# Patient Record
Sex: FEMALE | Race: OTHER | URBAN - METROPOLITAN AREA
[De-identification: names, ages, dates, MRNs, and addresses within clinical notes are randomized per-mention and may not be internally consistent; named-entity substitution may affect disease eponyms.]

---

## 2023-11-16 ENCOUNTER — TELEPHONE (OUTPATIENT)
Dept: OBGYN CLINIC | Facility: CLINIC | Age: 28
End: 2023-11-16

## 2023-11-16 NOTE — TELEPHONE ENCOUNTER
Patient requesting to transfer prenatal care from [facility], she was given GARRY of 4/15/2024, Initial Ultrasound, Prenatal Labs, Genetic testing completed, no Pap done. Last visit was 10/16 and is scheduled for next visit on 12/1. Paul Rodas will obtain reports prior to scheduling. Unsuccessful in reaching patient after several attempts made to follow up on records. Patient does not have voice mail.

## 2023-11-17 NOTE — TELEPHONE ENCOUNTER
11/17/23 called at 8:03 AM, unable to leave voicemail due to mailbox not set up. I will try again later.

## 2024-05-31 ENCOUNTER — HOSPITAL ENCOUNTER (EMERGENCY)
Facility: HOSPITAL | Age: 29
Discharge: HOME | End: 2024-05-31
Attending: EMERGENCY MEDICINE
Payer: COMMERCIAL

## 2024-05-31 VITALS
RESPIRATION RATE: 18 BRPM | HEART RATE: 69 BPM | OXYGEN SATURATION: 98 % | TEMPERATURE: 98.1 F | SYSTOLIC BLOOD PRESSURE: 98 MMHG | WEIGHT: 137 LBS | DIASTOLIC BLOOD PRESSURE: 74 MMHG

## 2024-05-31 DIAGNOSIS — Z20.3 NEED FOR POST EXPOSURE PROPHYLAXIS FOR RABIES: ICD-10-CM

## 2024-05-31 DIAGNOSIS — Z20.3 RABIES EXPOSURE: Primary | ICD-10-CM

## 2024-05-31 LAB
EXT PREGNANCY TEST URINE: NEGATIVE
EXT. CONTROL: NORMAL

## 2024-05-31 PROCEDURE — 96372 THER/PROPH/DIAG INJ SC/IM: CPT

## 2024-05-31 PROCEDURE — 81025 URINE PREGNANCY TEST: CPT

## 2024-05-31 PROCEDURE — 90375 RABIES IG IM/SC: CPT

## 2024-05-31 PROCEDURE — 99283 EMERGENCY DEPT VISIT LOW MDM: CPT

## 2024-05-31 PROCEDURE — 90471 IMMUNIZATION ADMIN: CPT

## 2024-05-31 PROCEDURE — 90675 RABIES VACCINE IM: CPT

## 2024-05-31 PROCEDURE — 99284 EMERGENCY DEPT VISIT MOD MDM: CPT

## 2024-05-31 RX ADMIN — RABIES VIRUS STRAIN PM-1503-3M ANTIGEN (PROPIOLACTONE INACTIVATED) AND WATER 1 ML: KIT at 19:18

## 2024-05-31 RX ADMIN — RABIES IMMUNE GLOBULIN (HUMAN) 1200 UNITS: 300 INJECTION, SOLUTION INFILTRATION; INTRAMUSCULAR at 19:21

## 2024-05-31 NOTE — ED PROVIDER NOTES
History  Chief Complaint   Patient presents with    Rabies Test     Pt arrives to start rabies series after coming in contact with a bat on Monday     Patient is a 28-year-old female currently breast-feeding arriving for evaluation after a bat exposure on where the bat touched her leg.  Patient's  was also exposed however they were unable to obtain the bat and was not tested.  Patient's currently breast-feeding and wanted to consider if she should get the vaccine or not, and has arrived to the ED with her  to initiate the series.  Patient was concerned due to her breast-feeding.  Patient denies any other complaints.        None       History reviewed. No pertinent past medical history.    History reviewed. No pertinent surgical history.    History reviewed. No pertinent family history.  I have reviewed and agree with the history as documented.    E-Cigarette/Vaping    E-Cigarette Use Never User      E-Cigarette/Vaping Substances     Social History     Tobacco Use    Smoking status: Never    Smokeless tobacco: Never   Vaping Use    Vaping status: Never Used   Substance Use Topics    Alcohol use: Not Currently    Drug use: Never       Review of Systems   Constitutional: Negative.    HENT: Negative.     Eyes: Negative.    Respiratory: Negative.     Cardiovascular: Negative.    Gastrointestinal: Negative.    Endocrine: Negative.    Genitourinary: Negative.    Musculoskeletal: Negative.    Skin: Negative.    Allergic/Immunologic: Negative.    Neurological: Negative.    Hematological: Negative.    Psychiatric/Behavioral: Negative.     All other systems reviewed and are negative.      Physical Exam  Physical Exam  Vitals and nursing note reviewed.   Constitutional:       Appearance: Normal appearance. She is normal weight.   HENT:      Head: Normocephalic.      Right Ear: External ear normal.      Left Ear: External ear normal.      Nose: Nose normal.      Mouth/Throat:      Mouth: Mucous membranes are moist.    Eyes:      Pupils: Pupils are equal, round, and reactive to light.   Cardiovascular:      Rate and Rhythm: Normal rate.      Pulses: Normal pulses.   Pulmonary:      Effort: Pulmonary effort is normal.   Abdominal:      General: Abdomen is flat.   Musculoskeletal:         General: Normal range of motion.      Cervical back: Normal range of motion.   Skin:     General: Skin is warm.      Capillary Refill: Capillary refill takes less than 2 seconds.   Neurological:      General: No focal deficit present.      Mental Status: She is alert and oriented to person, place, and time. Mental status is at baseline.   Psychiatric:         Mood and Affect: Mood normal.         Behavior: Behavior normal.         Thought Content: Thought content normal.         Judgment: Judgment normal.         Vital Signs  ED Triage Vitals [05/31/24 1849]   Temperature Pulse Respirations Blood Pressure SpO2   98.1 °F (36.7 °C) 69 18 98/74 98 %      Temp Source Heart Rate Source Patient Position - Orthostatic VS BP Location FiO2 (%)   Temporal Monitor Sitting Left arm --      Pain Score       --           Vitals:    05/31/24 1849   BP: 98/74   Pulse: 69   Patient Position - Orthostatic VS: Sitting         Visual Acuity      ED Medications  Medications   rabies vaccine, human diploid IM injection 1 mL (1 mL Intramuscular Given 5/31/24 1918)   rabies immune globulin, human (HyperRAB) injection 1,200 Units (1,200 Units Infiltration Given 5/31/24 1921)       Diagnostic Studies  Results Reviewed       Procedure Component Value Units Date/Time    POCT pregnancy, urine [[id number]]  (Normal) Resulted: 05/31/24 1918    Lab Status: Final result Updated: 05/31/24 1918     EXT Preg Test, Ur Negative     Control Valid                   No orders to display              Procedures  Procedures         ED Course  ED Course as of 05/31/24 2232   Fri May 31, 2024   1920 PREGNANCY TEST URINE: Negative                                             Medical Decision  Making  Patient presenting requesting initiation of rabies series.  Patient presenting with her , and the 2 of which were exposed to a bat on Monday.  Patient is currently breast-feeding and was concerned about possible breast milk transference to her infant.  Patient states that she feels more comfortable today.  Discussed with our pharmacist to confirm, and reviewed with patient that there is no reported effects, however did discuss that this is not specifically studied only in retrospect his information collected.  Did review with patient that rabies is fatal and is recommended to be received for prophylaxis due to its fatality, as it is a risk-benefit scenario.  Patient is willing to accept vaccine initiation. Reviewed vaccines schedule. Confirmed bat only touched her, but did not bite or wound.   Reviewed reasons to return to ed.  Patient verbalized understanding of diagnosis and agreement with discharge plan of care as well as understanding of reasons to return to ed.      Amount and/or Complexity of Data Reviewed  Labs: ordered. Decision-making details documented in ED Course.    Risk  Prescription drug management.             Disposition  Final diagnoses:   Rabies exposure   Need for post exposure prophylaxis for rabies     Time reflects when diagnosis was documented in both MDM as applicable and the Disposition within this note       Time User Action Codes Description Comment    5/31/2024  7:10 PM Dorothea Valentine Add [Z20.3] Rabies exposure     5/31/2024  7:10 PM Dorothea Valentine Add [Z20.3] Need for post exposure prophylaxis for rabies           ED Disposition       ED Disposition   Discharge    Condition   Stable    Date/Time   Fri May 31, 2024  7:09 PM    Comment   Kellie Vizcaino discharge to home/self care.                   Follow-up Information       Follow up With Specialties Details Why Contact Info Additional Information     [facility] Emergency Department Emergency  Medicine Go to  If symptoms worsen [id number] [facility] [address] [phone] [facility] Emergency Department, [address], [place] [address]            There are no discharge medications for this patient.      No discharge procedures on file.    PDMP Review       None            ED Provider  Electronically Signed by             MACK Gallegos  05/31/24 2031

## 2024-06-03 ENCOUNTER — OFFICE VISIT (OUTPATIENT)
Dept: URGENT CARE | Facility: CLINIC | Age: 29
End: 2024-06-03
Payer: COMMERCIAL

## 2024-06-03 VITALS
RESPIRATION RATE: 16 BRPM | HEIGHT: 63 IN | BODY MASS INDEX: 24.27 KG/M2 | TEMPERATURE: 97.1 F | WEIGHT: 137 LBS | HEART RATE: 78 BPM | OXYGEN SATURATION: 98 % | DIASTOLIC BLOOD PRESSURE: 60 MMHG | SYSTOLIC BLOOD PRESSURE: 108 MMHG

## 2024-06-03 DIAGNOSIS — Z20.9 EXPOSURE TO BAT WITHOUT KNOWN BITE: Primary | ICD-10-CM

## 2024-06-03 DIAGNOSIS — Z23 NEED FOR RABIES VACCINATION: ICD-10-CM

## 2024-06-03 PROCEDURE — 90675 RABIES VACCINE IM: CPT

## 2024-06-03 PROCEDURE — 90675 RABIES VACCINE IM: CPT | Performed by: PHYSICIAN ASSISTANT

## 2024-06-03 PROCEDURE — 99213 OFFICE O/P EST LOW 20 MIN: CPT | Performed by: PHYSICIAN ASSISTANT

## 2024-06-03 PROCEDURE — S9083 URGENT CARE CENTER GLOBAL: HCPCS | Performed by: PHYSICIAN ASSISTANT

## 2024-06-03 NOTE — PATIENT INSTRUCTIONS
Return on 6/7 for your next dose of the rabies series and then return again on 6/14 for your last dose.

## 2024-06-03 NOTE — PROGRESS NOTES
"  [facility]        NAME: Kellie Vizcaino is a 28 y.o. female  : 1995    MRN: [id number]  DATE: Josselyn 3, 2024  TIME: 7:44 PM    Assessment and Plan   Exposure to bat without known bite [Z20.9]  1. Exposure to bat without known bite        2. Need for rabies vaccination  Rabies vaccine IM (human diploid, IMOVAX)            Patient Instructions       Follow up with PCP in 3-5 days.  Proceed to  ER if symptoms worsen.    If tests have been performed at [facility], our office will contact you with results if changes need to be made to the care plan discussed with you at the visit.  You can review your full results on [facility].    Chief Complaint     Chief Complaint   Patient presents with    Immunizations     Pt presents for dose two day 3 rabies vaccination. Pt was exposed to a bat Tuesday.          History of Present Illness       Patient presents for next dose of the rabies series after a bat ran into her 4 days ago.  Went to the ER the next day and the start of the series.  This is day 3 dose.  Denies any bite wound, rash, fevers or other symptoms.        Review of Systems   Review of Systems   Constitutional:  Negative for fatigue and fever.   Skin:  Negative for rash and wound.         Current Medications     No current outpatient medications on file.    Current Allergies     Allergies as of 2024    (No Known Allergies)            The following portions of the patient's history were reviewed and updated as appropriate: allergies, current medications, past family history, past medical history, past social history, past surgical history and problem list.     No past medical history on file.    No past surgical history on file.    No family history on file.      Medications have been verified.        Objective   /60 (BP Location: Right arm, Patient Position: Sitting)   Pulse 78   Temp (!) 97.1 °F (36.2 °C)   Resp 16   Ht 5' 3\" (1.6 m)   Wt 62.1 kg (137 lb)   SpO2 98%   BMI " 24.27 kg/m²   No LMP recorded.       Physical Exam     Physical Exam  Constitutional:       Appearance: Normal appearance.   Skin:     General: Skin is warm and dry.      Findings: No rash.   Neurological:      Mental Status: She is alert.   Psychiatric:         Mood and Affect: Mood normal.         Behavior: Behavior normal.

## 2024-06-07 ENCOUNTER — CLINICAL SUPPORT (OUTPATIENT)
Dept: URGENT CARE | Facility: CLINIC | Age: 29
End: 2024-06-07
Payer: COMMERCIAL

## 2024-06-07 VITALS
BODY MASS INDEX: 24.27 KG/M2 | OXYGEN SATURATION: 99 % | TEMPERATURE: 97.2 F | DIASTOLIC BLOOD PRESSURE: 72 MMHG | SYSTOLIC BLOOD PRESSURE: 110 MMHG | HEART RATE: 81 BPM | HEIGHT: 63 IN | RESPIRATION RATE: 16 BRPM

## 2024-06-07 DIAGNOSIS — Z23 NEED FOR RABIES VACCINATION: Primary | ICD-10-CM

## 2024-06-07 PROCEDURE — 90675 RABIES VACCINE IM: CPT

## 2024-06-07 PROCEDURE — 96372 THER/PROPH/DIAG INJ SC/IM: CPT

## 2024-06-14 ENCOUNTER — CLINICAL SUPPORT (OUTPATIENT)
Dept: URGENT CARE | Facility: CLINIC | Age: 29
End: 2024-06-14
Payer: COMMERCIAL

## 2024-06-14 VITALS — TEMPERATURE: 97.3 F

## 2024-06-14 DIAGNOSIS — Z23 NEED FOR RABIES VACCINATION: Primary | ICD-10-CM

## 2024-06-14 PROCEDURE — 90675 RABIES VACCINE IM: CPT

## 2024-09-25 ENCOUNTER — OFFICE VISIT (OUTPATIENT)
Dept: URGENT CARE | Facility: CLINIC | Age: 29
End: 2024-09-25
Payer: COMMERCIAL

## 2024-09-25 VITALS
OXYGEN SATURATION: 96 % | HEART RATE: 112 BPM | RESPIRATION RATE: 16 BRPM | SYSTOLIC BLOOD PRESSURE: 110 MMHG | BODY MASS INDEX: 23.04 KG/M2 | DIASTOLIC BLOOD PRESSURE: 60 MMHG | TEMPERATURE: 104.7 F | WEIGHT: 130 LBS | HEIGHT: 63 IN

## 2024-09-25 DIAGNOSIS — J20.9 ACUTE BRONCHITIS, UNSPECIFIED ORGANISM: Primary | ICD-10-CM

## 2024-09-25 DIAGNOSIS — R50.9 FEVER, UNSPECIFIED FEVER CAUSE: ICD-10-CM

## 2024-09-25 LAB
SARS-COV-2 AG UPPER RESP QL IA: NEGATIVE
VALID CONTROL: NORMAL

## 2024-09-25 PROCEDURE — 99213 OFFICE O/P EST LOW 20 MIN: CPT

## 2024-09-25 PROCEDURE — S9083 URGENT CARE CENTER GLOBAL: HCPCS

## 2024-09-25 PROCEDURE — 87811 SARS-COV-2 COVID19 W/OPTIC: CPT

## 2024-09-25 RX ORDER — AMOXICILLIN 400 MG/5ML
1000 POWDER, FOR SUSPENSION ORAL 3 TIMES DAILY
Qty: 187.5 ML | Refills: 0 | Status: SHIPPED | OUTPATIENT
Start: 2024-09-25 | End: 2024-09-30

## 2024-09-25 RX ORDER — ACETAMINOPHEN 160 MG/5ML
650 SUSPENSION ORAL ONCE
Status: COMPLETED | OUTPATIENT
Start: 2024-09-25 | End: 2024-09-25

## 2024-09-25 RX ADMIN — ACETAMINOPHEN 650 MG: 160 SUSPENSION ORAL at 17:26

## 2024-09-25 NOTE — PATIENT INSTRUCTIONS
Take amoxicillin as prescribed.    Continue Tylenol for fever control.  Dose given here in the clinic at 5:30 PM.    Airborne for extra vitamin C / zinc.  Drink plenty of fluids to stay hydrated.  Throat Coat tea, warm water with lemon / honey.    Close follow-up with PCP in 3-5 days.    Go to the ED for any worsening symptoms.

## 2024-09-25 NOTE — PROGRESS NOTES
[facility]        NAME: Kellie Vizcaino is a 29 y.o. female  : 1995    MRN: [id number]  DATE: 2024  TIME: 10:58 PM    Assessment and Plan   Acute bronchitis, unspecified organism [J20.9]  1. Acute bronchitis, unspecified organism  amoxicillin (AMOXIL) 400 MG/5ML suspension      2. Fever, unspecified fever cause  acetaminophen (TYLENOL) oral suspension 650 mg    Poct Covid 19 Rapid Antigen Test          Patient aware of negative rapid Covid test.      Patient Instructions     Take amoxicillin as prescribed.    Continue Tylenol for fever control.  Dose given here in the clinic at 5:30 PM.    Airborne for extra vitamin C / zinc.  Drink plenty of fluids to stay hydrated.  Throat Coat tea, warm water with lemon / honey.    Close follow-up with PCP in 3-5 days.    Go to the ED for any worsening symptoms.    If tests are performed, our office will contact you with results only if changes need to made to the care plan discussed with you at the visit. You can review your full results on [facility].      Chief Complaint     Chief Complaint   Patient presents with    Cold Like Symptoms     Pt reports on  she developed a chills and body aches. Yesterday developed a cough & nausea. Denies vomiting. Taking tylenol. Currently breastfeeding.          History of Present Illness       29-year-old who presents for evaluation of cold-like symptoms x 3-4 days. Patient reports fevers, chills, and body aches at onset of illness. Yesterday she developed a loose cough that is productive of mucous. Feels nauseous. Taking Tylenol last dose this AM. Currently breastfeeding.        Review of Systems   Review of Systems   Constitutional:  Positive for chills, fatigue and fever.   HENT:  Positive for congestion. Negative for ear pain, sinus pressure and sore throat.    Eyes:  Negative for discharge and redness.   Respiratory:  Positive for cough. Negative for chest tightness, shortness of breath and  "wheezing.    Cardiovascular:  Negative for chest pain and palpitations.   Gastrointestinal:  Positive for nausea. Negative for abdominal pain, diarrhea and vomiting.   Musculoskeletal:  Positive for myalgias.   Skin:  Negative for pallor and rash.   Neurological:  Positive for headaches. Negative for dizziness and light-headedness.         Current Medications       Current Outpatient Medications:     amoxicillin (AMOXIL) 400 MG/5ML suspension, Take 12.5 mL (1,000 mg total) by mouth 3 (three) times a day for 5 days, Disp: 187.5 mL, Rfl: 0    Current Allergies     Allergies as of 09/25/2024    (No Known Allergies)            The following portions of the patient's history were reviewed and updated as appropriate: allergies, current medications, past family history, past medical history, past social history, past surgical history and problem list.     History reviewed. No pertinent past medical history.    History reviewed. No pertinent surgical history.    History reviewed. No pertinent family history.      Medications have been verified.        Objective   /60   Pulse (!) 112   Temp (!) 104.7 °F (40.4 °C)   Resp 16   Ht 5' 3\" (1.6 m)   Wt 59 kg (130 lb)   SpO2 96%   Breastfeeding Yes   BMI 23.03 kg/m²        Physical Exam     Physical Exam  Vitals and nursing note reviewed.   Constitutional:       General: She is not in acute distress.     Appearance: She is ill-appearing and diaphoretic.      Comments: febrile   HENT:      Head: Normocephalic and atraumatic.      Right Ear: Tympanic membrane, ear canal and external ear normal.      Left Ear: Tympanic membrane, ear canal and external ear normal.      Nose: Congestion present.      Mouth/Throat:      Mouth: Mucous membranes are moist.      Pharynx: Oropharynx is clear.   Eyes:      Conjunctiva/sclera: Conjunctivae normal.      Pupils: Pupils are equal, round, and reactive to light.   Cardiovascular:      Rate and Rhythm: Regular rhythm. Tachycardia " present.      Pulses: Normal pulses.      Heart sounds: Normal heart sounds.   Pulmonary:      Effort: Pulmonary effort is normal.      Breath sounds: Rhonchi present.   Musculoskeletal:         General: Normal range of motion.      Cervical back: Normal range of motion and neck supple.   Skin:     General: Skin is warm.      Capillary Refill: Capillary refill takes less than 2 seconds.   Neurological:      Mental Status: She is alert and oriented to person, place, and time.